# Patient Record
Sex: FEMALE | Race: BLACK OR AFRICAN AMERICAN | URBAN - METROPOLITAN AREA
[De-identification: names, ages, dates, MRNs, and addresses within clinical notes are randomized per-mention and may not be internally consistent; named-entity substitution may affect disease eponyms.]

---

## 2020-07-22 ENCOUNTER — TELEPHONE ENCOUNTER (OUTPATIENT)
Dept: URBAN - METROPOLITAN AREA CLINIC 96 | Facility: CLINIC | Age: 60
End: 2020-07-22

## 2020-08-04 ENCOUNTER — LAB OUTSIDE AN ENCOUNTER (OUTPATIENT)
Dept: URBAN - METROPOLITAN AREA CLINIC 96 | Facility: CLINIC | Age: 60
End: 2020-08-04

## 2020-08-05 ENCOUNTER — ERX REFILL RESPONSE (OUTPATIENT)
Age: 60
End: 2020-08-05

## 2020-08-05 LAB
CREATININE POC: 0.9
PERFORMING LAB: (no result)

## 2020-08-05 RX ORDER — PANTOPRAZOLE SODIUM 40 MG/1
TAKE 1 TABLET (40 MG) BY ORAL ROUTE ONCE DAILY TABLET, DELAYED RELEASE ORAL
Qty: 90 | Refills: 3

## 2020-10-16 ENCOUNTER — TELEPHONE ENCOUNTER (OUTPATIENT)
Dept: URBAN - METROPOLITAN AREA CLINIC 98 | Facility: CLINIC | Age: 60
End: 2020-10-16

## 2020-12-07 ENCOUNTER — TELEPHONE ENCOUNTER (OUTPATIENT)
Dept: URBAN - METROPOLITAN AREA CLINIC 96 | Facility: CLINIC | Age: 60
End: 2020-12-07

## 2020-12-09 ENCOUNTER — OFFICE VISIT (OUTPATIENT)
Dept: URBAN - METROPOLITAN AREA CLINIC 96 | Facility: CLINIC | Age: 60
End: 2020-12-09
Payer: SELF-PAY

## 2020-12-09 ENCOUNTER — TELEPHONE ENCOUNTER (OUTPATIENT)
Dept: URBAN - METROPOLITAN AREA CLINIC 92 | Facility: CLINIC | Age: 60
End: 2020-12-09

## 2020-12-09 ENCOUNTER — WEB ENCOUNTER (OUTPATIENT)
Dept: URBAN - METROPOLITAN AREA CLINIC 96 | Facility: CLINIC | Age: 60
End: 2020-12-09

## 2020-12-09 DIAGNOSIS — R10.84 GENERALIZED ABDOMINAL PAIN: ICD-10-CM

## 2020-12-09 DIAGNOSIS — R68.81 EARLY SATIETY: ICD-10-CM

## 2020-12-09 DIAGNOSIS — Z86.010 PERSONAL HISTORY OF COLONIC POLYPS: ICD-10-CM

## 2020-12-09 DIAGNOSIS — R11.0 NAUSEA: ICD-10-CM

## 2020-12-09 DIAGNOSIS — Z80.0 FAMILY HISTORY OF COLON CANCER: ICD-10-CM

## 2020-12-09 PROCEDURE — G8483 FLU IMM NO ADMIN DOC REA: HCPCS | Performed by: INTERNAL MEDICINE

## 2020-12-09 PROCEDURE — G8427 DOCREV CUR MEDS BY ELIG CLIN: HCPCS | Performed by: INTERNAL MEDICINE

## 2020-12-09 PROCEDURE — 3017F COLORECTAL CA SCREEN DOC REV: CPT | Performed by: INTERNAL MEDICINE

## 2020-12-09 PROCEDURE — G9903 PT SCRN TBCO ID AS NON USER: HCPCS | Performed by: INTERNAL MEDICINE

## 2020-12-09 PROCEDURE — G8417 CALC BMI ABV UP PARAM F/U: HCPCS | Performed by: INTERNAL MEDICINE

## 2020-12-09 PROCEDURE — 99214 OFFICE O/P EST MOD 30 MIN: CPT | Performed by: INTERNAL MEDICINE

## 2020-12-09 RX ORDER — PANTOPRAZOLE SODIUM 40 MG/1
TAKE 1 TABLET (40 MG) BY ORAL ROUTE ONCE DAILY TABLET, DELAYED RELEASE ORAL
Qty: 90 | Refills: 3 | Status: ACTIVE | COMMUNITY

## 2020-12-09 NOTE — HPI-TODAY'S VISIT:
60-year-old female previously seen in clinic on 5/29/2020 via telehealth.  EGD and colonoscopy 5/16/2018 with no H. pylori, normal esophageal biopsies, tubular adenoma in the sigmoid colon for which repeat colonoscopy was recommended in 3 years (2021) given family history of colon cancer in father. Prior right upper quadrant ultrasound 6/8/2016 with questionable liver lesion for which follow-up MRI of the liver was done 6/22/2016 with mild focal area of fatty liver infiltration noted with no suspicious liver lesion. At prior visit 5/29/2020, CT was ordered. Normal GI exam 8/2020 (see chart).   Patient reports decreased appetite, will have abdominal pain and nausea after eating. Early satiety issues. No dysphagia, no odynophagia. No alcohol.

## 2020-12-28 ENCOUNTER — OFFICE VISIT (OUTPATIENT)
Dept: URBAN - METROPOLITAN AREA MEDICAL CENTER 28 | Facility: MEDICAL CENTER | Age: 60
End: 2020-12-28
Payer: SELF-PAY

## 2020-12-28 DIAGNOSIS — R10.84 ABDOMINAL CRAMPING, GENERALIZED: ICD-10-CM

## 2020-12-28 DIAGNOSIS — K31.7 BENIGN GASTRIC POLYP: ICD-10-CM

## 2020-12-28 DIAGNOSIS — K20.80 ESOPHAGITIS, LOS ANGELES GRADE B: ICD-10-CM

## 2020-12-28 DIAGNOSIS — K29.60 ADENOPAPILLOMATOSIS GASTRICA: ICD-10-CM

## 2020-12-28 PROCEDURE — 43239 EGD BIOPSY SINGLE/MULTIPLE: CPT | Performed by: INTERNAL MEDICINE

## 2020-12-28 RX ORDER — PANTOPRAZOLE SODIUM 40 MG/1
TAKE 1 TABLET (40 MG) BY ORAL ROUTE ONCE DAILY TABLET, DELAYED RELEASE ORAL
Qty: 90 | Refills: 3 | Status: ACTIVE | COMMUNITY

## 2021-02-17 ENCOUNTER — TELEPHONE ENCOUNTER (OUTPATIENT)
Dept: URBAN - METROPOLITAN AREA CLINIC 98 | Facility: CLINIC | Age: 61
End: 2021-02-17

## 2021-02-17 RX ORDER — PANTOPRAZOLE SODIUM 40 MG/1
TAKE 1 TABLET (40 MG) BY ORAL ROUTE ONCE DAILY TABLET, DELAYED RELEASE ORAL
Qty: 90 | Refills: 3 | Status: ACTIVE | COMMUNITY

## 2021-02-17 RX ORDER — SODIUM, POTASSIUM,MAG SULFATES 17.5-3.13G
AS DIRECTED SOLUTION, RECONSTITUTED, ORAL ORAL
OUTPATIENT
Start: 2021-02-18

## 2021-06-28 ENCOUNTER — OFFICE VISIT (OUTPATIENT)
Dept: URBAN - METROPOLITAN AREA MEDICAL CENTER 28 | Facility: MEDICAL CENTER | Age: 61
End: 2021-06-28
Payer: SELF-PAY

## 2021-06-28 DIAGNOSIS — Z80.0 FAMILY HISTORY MALIGNANT NEOPLASM OF BILIARY TRACT: ICD-10-CM

## 2021-06-28 DIAGNOSIS — Z86.010 H/O ADENOMATOUS POLYP OF COLON: ICD-10-CM

## 2021-06-28 PROCEDURE — G0105 COLORECTAL SCRN; HI RISK IND: HCPCS | Performed by: INTERNAL MEDICINE

## 2021-07-08 ENCOUNTER — TELEPHONE ENCOUNTER (OUTPATIENT)
Dept: URBAN - METROPOLITAN AREA CLINIC 98 | Facility: CLINIC | Age: 61
End: 2021-07-08

## 2021-08-13 ENCOUNTER — DASHBOARD ENCOUNTERS (OUTPATIENT)
Age: 61
End: 2021-08-13

## 2021-08-13 PROBLEM — 428283002: Status: ACTIVE | Noted: 2020-12-09

## 2021-08-16 ENCOUNTER — OFFICE VISIT (OUTPATIENT)
Dept: URBAN - METROPOLITAN AREA CLINIC 96 | Facility: CLINIC | Age: 61
End: 2021-08-16

## 2021-08-16 RX ORDER — PANTOPRAZOLE SODIUM 40 MG/1
TAKE 1 TABLET (40 MG) BY ORAL ROUTE ONCE DAILY TABLET, DELAYED RELEASE ORAL
Qty: 90 | Refills: 3 | Status: ACTIVE | COMMUNITY

## 2021-08-16 RX ORDER — SODIUM, POTASSIUM,MAG SULFATES 17.5-3.13G
AS DIRECTED SOLUTION, RECONSTITUTED, ORAL ORAL
OUTPATIENT

## 2021-08-16 RX ORDER — SODIUM, POTASSIUM,MAG SULFATES 17.5-3.13G
AS DIRECTED SOLUTION, RECONSTITUTED, ORAL ORAL
Status: DISCONTINUED | COMMUNITY
Start: 2021-02-18

## 2021-08-16 NOTE — HPI-TODAY'S VISIT:
61-year-old female seen 12/9/2020, previously seen in clinic on 5/29/2020 via telehealth.  EGD and colonoscopy 5/16/2018 with no H. pylori, normal esophageal biopsies, tubular adenoma in the sigmoid colon for which repeat colonoscopy was recommended in 3 years (2021) given family history of colon cancer in father.  Prior right upper quadrant ultrasound 6/8/2016 with questionable liver lesion for which follow-up MRI of the liver was done 6/22/2016 with mild focal area of fatty liver infiltration noted with no suspicious liver lesion. At prior visit 5/29/2020, CT was ordered. Normal GI exam 8/2020 (see chart).   Patient previously reported decreased appetite, will have abdominal pain and nausea after eating. Early satiety issues. No dysphagia, no odynophagia. No alcohol.  EGD done 12/28/2020 with hiatal hernia. Pathology with normal duodenal bx, no H pylori, fundic gland polyps, NO Grullon's.   Colonoscopy done 6/28/2021 with fair prep, lengthy colon noted, PEDIATRIC COLONOSCOPE USED. Repeat colonoscopy was rec in 3 years given fair prep. (2024)  She reports

## 2021-08-16 NOTE — PHYSICAL EXAM CONSTITUTIONAL:
well developed, well nourished , in no acute distress , ambulating without difficulty , normal communication ability
Abdomen soft, nontender, nondistended

## 2021-09-07 ENCOUNTER — ERX REFILL RESPONSE (OUTPATIENT)
Dept: URBAN - METROPOLITAN AREA CLINIC 96 | Facility: CLINIC | Age: 61
End: 2021-09-07

## 2021-09-07 RX ORDER — PANTOPRAZOLE SODIUM 40 MG/1
TAKE 1 TABLET (40 MG) BY ORAL ROUTE ONCE DAILY TABLET, DELAYED RELEASE ORAL
Qty: 90 TABLET | Refills: 1 | OUTPATIENT

## 2021-09-07 RX ORDER — PANTOPRAZOLE SODIUM 40 MG/1
TAKE 1 TABLET (40 MG) BY ORAL ROUTE ONCE DAILY TABLET, DELAYED RELEASE ORAL
Qty: 90 | Refills: 3 | OUTPATIENT

## 2021-10-11 ENCOUNTER — TELEPHONE ENCOUNTER (OUTPATIENT)
Dept: URBAN - METROPOLITAN AREA CLINIC 98 | Facility: CLINIC | Age: 61
End: 2021-10-11

## 2021-10-11 ENCOUNTER — ERX REFILL RESPONSE (OUTPATIENT)
Dept: URBAN - METROPOLITAN AREA CLINIC 96 | Facility: CLINIC | Age: 61
End: 2021-10-11

## 2021-10-11 RX ORDER — HYOSCYAMINE SULFATE 0.12 MG/1
TAKE ONE TABLET BY MOUTH EVERY FOUR HOURS AS NEEDED TABLET ORAL
Qty: 45 TABLET | Refills: 1 | OUTPATIENT

## 2021-11-15 ENCOUNTER — TELEPHONE ENCOUNTER (OUTPATIENT)
Dept: URBAN - METROPOLITAN AREA CLINIC 98 | Facility: CLINIC | Age: 61
End: 2021-11-15

## 2021-11-15 RX ORDER — HYOSCYAMINE SULFATE 0.12 MG/1
TAKE ONE TABLET BY MOUTH EVERY FOUR HOURS AS NEEDED TABLET ORAL
Qty: 45 TABLET | Refills: 0
End: 2022-02-13

## 2021-11-29 ENCOUNTER — ERX REFILL RESPONSE (OUTPATIENT)
Dept: URBAN - METROPOLITAN AREA CLINIC 96 | Facility: CLINIC | Age: 61
End: 2021-11-29

## 2021-11-29 RX ORDER — HYOSCYAMINE SULFATE 0.12 MG/1
TAKE ONE TABLET BY MOUTH EVERY FOUR HOURS AS NEEDED TABLET ORAL
Qty: 45 TABLET | Refills: 1 | OUTPATIENT

## 2021-11-29 RX ORDER — HYOSCYAMINE SULFATE 0.12 MG/1
TAKE ONE TABLET BY MOUTH EVERY FOUR HOURS AS NEEDED TABLET ORAL
Qty: 45 TABLET | Refills: 0 | OUTPATIENT

## 2022-03-09 ENCOUNTER — ERX REFILL RESPONSE (OUTPATIENT)
Dept: URBAN - METROPOLITAN AREA CLINIC 96 | Facility: CLINIC | Age: 62
End: 2022-03-09

## 2022-03-09 RX ORDER — PANTOPRAZOLE SODIUM 40 MG/1
TAKE 1 TABLET (40 MG) BY ORAL ROUTE ONCE DAILY TABLET, DELAYED RELEASE ORAL
Qty: 30 TABLET | Refills: 1 | OUTPATIENT